# Patient Record
Sex: MALE | Race: WHITE
[De-identification: names, ages, dates, MRNs, and addresses within clinical notes are randomized per-mention and may not be internally consistent; named-entity substitution may affect disease eponyms.]

---

## 2017-04-13 ENCOUNTER — HOSPITAL ENCOUNTER (OUTPATIENT)
Dept: HOSPITAL 80 - BHLMT | Age: 67
End: 2017-04-13
Attending: INTERNAL MEDICINE
Payer: COMMERCIAL

## 2017-04-13 DIAGNOSIS — I10: ICD-10-CM

## 2017-04-13 DIAGNOSIS — E78.5: ICD-10-CM

## 2017-04-13 DIAGNOSIS — I25.10: Primary | ICD-10-CM

## 2017-10-04 ENCOUNTER — HOSPITAL ENCOUNTER (OUTPATIENT)
Dept: HOSPITAL 80 - FED | Age: 67
Setting detail: OBSERVATION
LOS: 1 days | Discharge: HOME | End: 2017-10-05
Attending: HOSPITALIST | Admitting: HOSPITALIST
Payer: COMMERCIAL

## 2017-10-04 DIAGNOSIS — I25.119: Primary | ICD-10-CM

## 2017-10-04 DIAGNOSIS — I45.10: ICD-10-CM

## 2017-10-04 DIAGNOSIS — M54.5: ICD-10-CM

## 2017-10-04 DIAGNOSIS — Z95.5: ICD-10-CM

## 2017-10-04 DIAGNOSIS — Z95.1: ICD-10-CM

## 2017-10-04 DIAGNOSIS — N17.9: ICD-10-CM

## 2017-10-04 DIAGNOSIS — E86.0: ICD-10-CM

## 2017-10-04 DIAGNOSIS — I10: ICD-10-CM

## 2017-10-04 DIAGNOSIS — Z23: ICD-10-CM

## 2017-10-04 DIAGNOSIS — E78.5: ICD-10-CM

## 2017-10-04 DIAGNOSIS — F41.8: ICD-10-CM

## 2017-10-04 LAB
% IMMATURE GRANULYOCYTES: 0.3 % (ref 0–1.1)
ABSOLUTE IMMATURE GRANULOCYTES: 0.03 10^3/UL (ref 0–0.1)
ABSOLUTE NRBC COUNT: 0 10^3/UL (ref 0–0.01)
ADD DIFF?: NO
ADD MORPH?: NO
ADD SCAN?: NO
ALBUMIN SERPL-MCNC: 4.7 G/DL (ref 3.5–5)
ALP SERPL-CCNC: 102 IU/L (ref 38–126)
ALT SERPL-CCNC: 55 IU/L (ref 21–72)
ANION GAP SERPL CALC-SCNC: 15 MEQ/L (ref 8–16)
APTT BLD: 29.2 SEC (ref 23–38)
AST SERPL-CCNC: 30 IU/L (ref 17–59)
ATYPICAL LYMPHOCYTE FLAG: 10 (ref 0–99)
BILIRUB SERPL-MCNC: 1.4 MG/DL (ref 0.1–1.4)
BILIRUBIN-CONJUGATED: 0.4 MG/DL (ref 0–0.5)
BILIRUBIN-UNCONJUGATED: 1 MG/DL (ref 0–1.1)
CALCIUM SERPL-MCNC: 10.3 MG/DL (ref 8.5–10.4)
CHLORIDE SERPL-SCNC: 106 MEQ/L (ref 97–110)
CO2 SERPL-SCNC: 19 MEQ/L (ref 22–31)
CREAT SERPL-MCNC: 1.5 MG/DL (ref 0.7–1.3)
ERYTHROCYTE [DISTWIDTH] IN BLOOD BY AUTOMATED COUNT: 12.5 % (ref 11.5–15.2)
FRAGMENT RBC FLAG: 0 (ref 0–99)
GFR SERPL CREATININE-BSD FRML MDRD: 47 ML/MIN/{1.73_M2}
GLUCOSE SERPL-MCNC: 158 MG/DL (ref 70–100)
HCT VFR BLD CALC: 49 % (ref 40–51)
HGB BLD-MCNC: 17.5 G/DL (ref 13.7–17.5)
INR PPP: 1.03 (ref 0.83–1.16)
LEFT SHIFT FLG: 0 (ref 0–99)
LIPEMIA HEMOLYSIS FLAG: 90 (ref 0–99)
MCH RBC BLDCO QN: 32 PG (ref 27.9–34.1)
MCHC RBC AUTO-ENTMCNC: 35.7 G/DL (ref 32.4–36.7)
MCV RBC AUTO: 89.6 FL (ref 81.5–99.8)
NRBC-AUTO%: 0 % (ref 0–0.2)
PLATELET # BLD: 238 10^3/UL (ref 150–400)
PLATELET CLUMPS FLAG: 0 (ref 0–99)
PMV BLD AUTO: 9.5 FL (ref 8.7–11.7)
POTASSIUM SERPL-SCNC: 4 MEQ/L (ref 3.5–5.2)
PROT SERPL-MCNC: 7.5 G/DL (ref 6.3–8.2)
PROTHROMBIN TIME: 13.4 SEC (ref 12–15)
RBC # BLD AUTO: 5.47 10^6/UL (ref 4.4–6.38)
SODIUM SERPL-SCNC: 140 MEQ/L (ref 134–144)
TROPONIN I SERPL-MCNC: < 0.012 NG/ML (ref 0–0.03)

## 2017-10-04 PROCEDURE — G0008 ADMIN INFLUENZA VIRUS VAC: HCPCS

## 2017-10-04 PROCEDURE — 4A023N7 MEASUREMENT OF CARDIAC SAMPLING AND PRESSURE, LEFT HEART, PERCUTANEOUS APPROACH: ICD-10-PCS | Performed by: INTERNAL MEDICINE

## 2017-10-04 PROCEDURE — B2111ZZ FLUOROSCOPY OF MULTIPLE CORONARY ARTERIES USING LOW OSMOLAR CONTRAST: ICD-10-PCS | Performed by: INTERNAL MEDICINE

## 2017-10-04 PROCEDURE — 93005 ELECTROCARDIOGRAM TRACING: CPT

## 2017-10-04 PROCEDURE — 71020: CPT

## 2017-10-04 PROCEDURE — G0378 HOSPITAL OBSERVATION PER HR: HCPCS

## 2017-10-04 PROCEDURE — 99285 EMERGENCY DEPT VISIT HI MDM: CPT

## 2017-10-04 PROCEDURE — B21F1ZZ FLUOROSCOPY OF OTHER BYPASS GRAFT USING LOW OSMOLAR CONTRAST: ICD-10-PCS | Performed by: INTERNAL MEDICINE

## 2017-10-04 PROCEDURE — B2151ZZ FLUOROSCOPY OF LEFT HEART USING LOW OSMOLAR CONTRAST: ICD-10-PCS | Performed by: INTERNAL MEDICINE

## 2017-10-04 PROCEDURE — C1760 CLOSURE DEV, VASC: HCPCS

## 2017-10-04 PROCEDURE — 93459 L HRT ART/GRFT ANGIO: CPT

## 2017-10-04 PROCEDURE — 90686 IIV4 VACC NO PRSV 0.5 ML IM: CPT

## 2017-10-04 RX ADMIN — SODIUM CHLORIDE SCH MLS: 900 INJECTION, SOLUTION INTRAVENOUS at 21:14

## 2017-10-04 NOTE — CPEKG
Heart Rate: 84

RR Interval: 714

P-R Interval: 204

QRSD Interval: 126

QT Interval: 404

QTC Interval: 478

P Axis: 74

QRS Axis: 73

T Wave Axis: 54

EKG Severity - ABNORMAL ECG -

EKG Impression: SINUS RHYTHM

EKG Impression: RIGHT BUNDLE BRANCH BLOCK

Electronically Signed By: Salvador Suero 05-Oct-2017 08:13:40

## 2017-10-04 NOTE — CPEKG
Heart Rate: 66

RR Interval: 909

P-R Interval: 204

QRSD Interval: 126

QT Interval: 428

QTC Interval: 449

P Axis: 42

QRS Axis: 64

T Wave Axis: 53

EKG Severity - ABNORMAL ECG -

EKG Impression: SINUS RHYTHM

EKG Impression: RIGHT BUNDLE BRANCH BLOCK

Electronically Signed By: Salvador Suero 05-Oct-2017 08:13:34

## 2017-10-04 NOTE — EDPHY
General


Narrative: 





CHIEF COMPLAINT: 


Chest pain, shortness of breath





HISTORY OF PRESENT ILLNESS: 


Patient complains of chest pain, high blood pressure, headache and shortness of 

breath.  The high blood pressure and headache started 3 days ago.  Gradual 

onset.  Intermittent duration.  Mild-to-moderate.  No neck pain or stiffness.  

Some shortness of breath with this.  Chest pain started 2 hours prior to 

arrival.  This is left-sided.  It radiates to the back.  Worse with exertion.  

Shortness of breath is associated with.  No nausea or vomiting.  Some 

diaphoresis.  Some cold chills.  No recent travel or trauma.  No history of 

venous thrombolic event.  Does have extensive coronary artery disease status 

post CABG and stents.  Positive family history for coronary artery disease.  No 

other associated complaints or modifying factors.





REVIEW OF SYSTEMS:


Ten systems reviewed and are negative unless otherwise noted in the HPI





PCP:


Dr. Parmar





SPECIALISTS:


Dr. Delgado





PAST MEDICAL HISTORY: 


Coronary artery disease status post CABG a stent placement





PAST SURGICAL HISTORY:


CABG, hernia repair, finger amputation





SOCIAL HISTORY:


Nonsmoker.  No alcohol.  Lives here with his wife





FAMILY HISTORY:


Noncontributory





EXAMINATION


General Appearance:  Alert, no distress


Head: normocephalic, atraumatic


Eyes:  Pupils equal and round, no conjunctival pallor or injection


ENT, Mouth:  Mucous membranes moist


Neck:  Normal inspection, supple, non-tender


Respiratory:  Lungs are clear to auscultation.  No wheezing, rhonchi or crackles


Cardiovascular:  Regular rate and rhythm.  No murmur.


Gastrointestinal:  Abdomen is soft and nontender


Back: non-tender, no bony abnormalities


Neurological:  A&O, nonfocal, normal gait


Skin:  Warm and dry, no rash


Extremities:  Nontender, no pedal edema


Psychiatric:  Mood and affect normal





DIFFERENTIAL DIAGNOSES:


Including but not limited to ACS, PE, pneumonia, CHF








MDM:


6:03 p.m.


Chest pain of 2 hours duration with shortness of breath and dyspnea on 

exertion.  Patient is mildly tachypneic at time of arrival.  This is a patient 

with extensive coronary artery disease with CABG, 3 stents prior CABG, 2 stents 

post CABG.  He has not have a stress test or heart catheterization with in 2 

years.  Does not know what is echocardiogram was.  I have ordered cardiac labs, 

D-dimer, chest x-ray.  Plan for admission to the hospital for serial troponins





7:00 p.m.


Chest x-ray unremarkable.  Laboratory studies are negative, but troponin is 

pending.





8:00 p.m.


Troponin is negative.  D-dimer negative.  Serum creatinine is mildly elevated 

but relatively close to baseline.  I discussed the case with the hospitalist.  

He will be admitted in stable condition for serial troponins.  Repeat EKG has 

been requested by Dr. Cushing and this is currently being obtained.  Suspect 

that this is stable angina versus unstable angina.














- Diagnostics


Imaging Results: 


 Imaging Impressions





Chest X-Ray  10/04/17 18:02


Impression: Clear lungs. No acute cardiopulmonary process.














- History


Smoking Status: Never smoked





- Objective


Vital Signs: 


 Initial Vital Signs











Temperature (C)  97.5 F   10/04/17 17:48


 


Heart Rate  76   10/04/17 17:48


 


Respiratory Rate  21 H  10/04/17 17:48


 


Blood Pressure  111/74   10/04/17 17:48


 


O2 Sat (%)  98   10/04/17 17:48








 











O2 Delivery Mode               Room Air














Allergies/Adverse Reactions: 


 





clindamycin Allergy (Verified 10/04/17 17:46)


 








Home Medications: 














 Medication  Instructions  Recorded


 


Aspirin [Aspirin 325 mg (*)] 325 mg PO DAILY 11/18/16


 


Atorvastatin Calcium [Lipitor 80 80 mg PO DAILY 11/18/16





mg]  


 


DULoxetine [Cymbalta 60 MG (*)] 60 mg PO DAILY 11/18/16


 


Herbals/Supplements -Info Only 1 ea PO DAILY 11/18/16


 


Hydrochlorothiazide [HCTZ (*)] 25 mg PO DAILY 11/18/16


 


Lisinopril [Zestril 10 mg (*)] 10 mg PO DAILY 11/18/16


 


Multivitamins [Multivitamin (*)] 1 each PO DAILY 11/18/16


 


Nebivolol HCl [Bystolic 5 mg (*)] 5 mg PO DAILY 11/18/16


 


Omega-3 Fatty Acids [Fish Oil 1000 1,000 mg PO DAILY 11/18/16





mg (*)]  


 


traMADol [Ultram 50 mg (*)] 50 mg PO HS 11/18/16


 


Clopidogrel Bisulfate [Plavix (*)] 75 mg PO HS 10/04/17


 


Docusate Sodium [Colace 100 MG (*)] 100 mg PO Q2D 10/04/17


 


traZODone [traZODONE 100MG (*)] 100 mg PO HS 10/04/17











Laboratory Results: 


 Laboratory Results





 10/04/17 17:47 





 10/04/17 17:47 





 











  10/04/17 10/04/17 10/04/17





  17:47 17:47 17:47


 


WBC      9.84 10^3/uL H 10^3/uL





     (3.80-9.50) 


 


RBC      5.47 10^6/uL 10^6/uL





     (4.40-6.38) 


 


Hgb      17.5 g/dL g/dL





     (13.7-17.5) 


 


Hct      49.0 % %





     (40.0-51.0) 


 


MCV      89.6 fL fL





     (81.5-99.8) 


 


MCH      32.0 pg pg





     (27.9-34.1) 


 


MCHC      35.7 g/dL g/dL





     (32.4-36.7) 


 


RDW      12.5 % %





     (11.5-15.2) 


 


Plt Count      238 10^3/uL 10^3/uL





     (150-400) 


 


MPV      9.5 fL fL





     (8.7-11.7) 


 


Neut % (Auto)      79.2 % H %





     (39.3-74.2) 


 


Lymph % (Auto)      10.4 % L %





     (15.0-45.0) 


 


Mono % (Auto)      8.7 % %





     (4.5-13.0) 


 


Eos % (Auto)      0.9 % %





     (0.6-7.6) 


 


Baso % (Auto)      0.5 % %





     (0.3-1.7) 


 


Nucleat RBC Rel Count      0.0 % %





     (0.0-0.2) 


 


Absolute Neuts (auto)      7.79 10^3/uL H 10^3/uL





     (1.70-6.50) 


 


Absolute Lymphs (auto)      1.02 10^3/uL 10^3/uL





     (1.00-3.00) 


 


Absolute Monos (auto)      0.86 10^3/uL H 10^3/uL





     (0.30-0.80) 


 


Absolute Eos (auto)      0.09 10^3/uL 10^3/uL





     (0.03-0.40) 


 


Absolute Basos (auto)      0.05 10^3/uL 10^3/uL





     (0.02-0.10) 


 


Absolute Nucleated RBC      0.00 10^3/uL 10^3/uL





     (0-0.01) 


 


Immature Gran %      0.3 % %





     (0.0-1.1) 


 


Immature Gran #      0.03 10^3/uL 10^3/uL





     (0.00-0.10) 


 


PT    13.4 SEC SEC  





    (12.0-15.0)  


 


INR    1.03   





    (0.83-1.16)  


 


APTT    29.2 SEC SEC  





    (23.0-38.0)  


 


D-Dimer    < 0.27 ug/mLFEU ug/mLFEU  





    (0.00-0.50)  


 


Sodium  140 mEq/L mEq/L    





   (134-144)   


 


Potassium  4.0 mEq/L mEq/L    





   (3.5-5.2)   


 


Chloride  106 mEq/L mEq/L    





   ()   


 


Carbon Dioxide  19 mEq/l L mEq/l    





   (22-31)   


 


Anion Gap  15 mEq/L mEq/L    





   (8-16)   


 


BUN  29 mg/dL H mg/dL    





   (7-23)   


 


Creatinine  1.5 mg/dL H mg/dL    





   (0.7-1.3)   


 


Estimated GFR  47     





    


 


Glucose  158 mg/dL H mg/dL    





   ()   


 


Calcium  10.3 mg/dL mg/dL    





   (8.5-10.4)   


 


Total Bilirubin  1.4 mg/dL mg/dL    





   (0.1-1.4)   


 


Conjugated Bilirubin  0.4 mg/dL mg/dL    





   (0.0-0.5)   


 


Unconjugated Bilirubin  1.0 mg/dL mg/dL    





   (0.0-1.1)   


 


AST  30 IU/L IU/L    





   (17-59)   


 


ALT  55 IU/L IU/L    





   (21-72)   


 


Alkaline Phosphatase  102 IU/L IU/L    





   ()   


 


Troponin I  < 0.012 ng/mL ng/mL    





   (0.000-0.034)   


 


NT-Pro-B Natriuret Pep  459 pg/mL H pg/mL    





   (0-125)   


 


Total Protein  7.5 g/dL g/dL    





   (6.3-8.2)   


 


Albumin  4.7 g/dL g/dL    





   (3.5-5.0)   


 


Lipase  66 IU/L IU/L    





   ()   











Medications Given: 


 





Acetaminophen (Tylenol)  650 mg PO Q4HRS PRN


   PRN Reason: Pain, Mild/Fever, Can Take PO


   Stop: 04/02/18 19:17


   Last Admin: 10/04/17 21:28 Dose:  650 mg


Atorvastatin Calcium (Lipitor)  80 mg PO HS [facility]


   Stop: 04/02/18 20:59


   Last Admin: 10/04/17 21:28 Dose:  80 mg


Clopidogrel Bisulfate (Plavix)  75 mg PO HS [facility]


   Stop: 04/02/18 20:59


   Last Admin: 10/04/17 21:28 Dose:  75 mg


Duloxetine HCl (Cymbalta)  60 mg PO [facility]


   Stop: 04/02/18 20:59


   Last Admin: 10/04/17 21:27 Dose:  60 mg


Heparin Sodium (Porcine) (Heparin 50 Units/Ml (Premix))  500 mls @ 0 mls/hr IV 

CONT DARRYL; Per Protocol


   PRN Reason: Protocol


   Stop: 04/02/18 19:44


   Last Admin: 10/04/17 21:13 Dose:  500 mls


Sodium Chloride (Ns)  1,000 mls @ 75 mls/hr IV CONT [facility]


   Stop: 04/02/18 19:59


   Last Admin: 10/04/17 21:14 Dose:  1,000 mls


Tramadol HCl (Ultram)  100 mg PO BID [facility]


   Stop: 04/02/18 20:59


   Last Admin: 10/04/17 21:27 Dose:  100 mg


Trazodone HCl (Trazodone)  100 mg PO [facility]


   Stop: 04/02/18 20:59


   Last Admin: 10/04/17 21:28 Dose:  100 mg





Discontinued Medications





Aspirin (Aspirin)  325 mg PO EDNOW ONE


   Stop: 10/04/17 19:24


   Last Admin: 10/04/17 21:21 Dose:  Not Given


Aspirin (Aspirin)  325 mg PO EDNOW ONE


   Stop: 10/04/17 19:24


   Last Admin: 10/04/17 21:19 Dose:  Not Given


Heparin Sodium (Porcine) (Heparin Injection)  0 unit IVP ONCE ONE


   PRN Reason: Protocol


   Stop: 10/04/17 19:46


   Last Admin: 10/04/17 21:11 Dose:  5,882 units








Departure





- Departure


Disposition: [facility]


Clinical Impression: 


 Stable angina





CAD (coronary artery disease)


Qualifiers:


 Coronary Disease-Associated Artery/Lesion type: unspecified vessel or lesion 

type Native vs. transplanted heart: native heart Associated angina: with stable 

angina Qualified Code(s): I25.118 - Atherosclerotic heart disease of native 

coronary artery with other forms of angina pectoris





Condition: Good


Referrals: 


Snow Parmar MD [[facility] Primary Care Provider] - As per Instructions

## 2017-10-05 VITALS
HEART RATE: 50 BPM | SYSTOLIC BLOOD PRESSURE: 145 MMHG | DIASTOLIC BLOOD PRESSURE: 81 MMHG | TEMPERATURE: 97.5 F | OXYGEN SATURATION: 96 %

## 2017-10-05 VITALS — RESPIRATION RATE: 17 BRPM

## 2017-10-05 LAB
ANION GAP SERPL CALC-SCNC: 10 MEQ/L (ref 8–16)
CALCIUM SERPL-MCNC: 9.7 MG/DL (ref 8.5–10.4)
CHLORIDE SERPL-SCNC: 104 MEQ/L (ref 97–110)
CO2 SERPL-SCNC: 25 MEQ/L (ref 22–31)
CREAT SERPL-MCNC: 1.5 MG/DL (ref 0.7–1.3)
GFR SERPL CREATININE-BSD FRML MDRD: 47 ML/MIN/{1.73_M2}
GLUCOSE SERPL-MCNC: 101 MG/DL (ref 70–100)
POTASSIUM SERPL-SCNC: 3.8 MEQ/L (ref 3.5–5.2)
SODIUM SERPL-SCNC: 139 MEQ/L (ref 134–144)

## 2017-10-05 RX ADMIN — SODIUM CHLORIDE SCH MLS: 900 INJECTION, SOLUTION INTRAVENOUS at 10:44

## 2017-10-05 NOTE — GDS
[f rep st]



                                                             DISCHARGE SUMMARY





DISCHARGE DIAGNOSES:  

1.  Noncardiac chest pain, suspect probable anxiety.

2.  Coronary artery disease, status post previous stents and coronary artery bypass graft.

3.  Acute renal failure due to dehydration.



HISTORY:  The patient is a 67-year-old male who has an extensive cardiac history including previous s
tents that failed, leading to a 4-vessel CABG.  He has done well for almost 10 years.  He now represe
nts with recurrence of chest pain.  Given his complicated history, cardiology elected to bring him [facility] to cardiac catheterization.  His catheterization looked great and his stents are wide open.  T
here has been no change from previous.  His D-dimer was negative.  Incidentally noted on presentation
 was a creatinine elevation of 1.5 when his baseline is normal.  He was hydrated quite aggressively p
rior to his cardiac catheterization, and according to pressure measurements during cath, Dr. Jasmin amaya he is fully hydrated at this time.  Recommendation per Cardiology is discontinuation of hydrochl
orothiazide at discharge.  He is probably okay to resume lisinopril in the morning.  This should be f
ollowed closely as an outpatient.



DISCHARGE MEDICATIONS:  Please see computerized record for full detailed list.  The only medication c
hange is discontinuation of hydrochlorothiazide.



ADDITIONAL DISCHARGE INSTRUCTIONS:  Repeat CHEM-7 in 1 week. 



Patient was seen and examined by me on the day of discharge.





Job #:  [id number]

## 2017-10-05 NOTE — ASMTCMCOM
CM Note

 

CM Note                       

Notes:

Please disregard note an follow previous note with NTBD.

Chart reviewed. Discussed status with patient RN. No needs identified at present time. CM available 


should needs arise.

 

Date Signed:  10/05/2017 03:54 PM

Electronically Signed By:Nevaeh Hull RN

## 2017-10-05 NOTE — CPIP
[f rep st]



                                                       INVASIVE CARDIAC PROCEDURE





DATE OF PROCEDURE:  10/05/2017



PROCEDURE:  

1.  Coronary angiography.

2.  Bypass graft angiography. 

3.  Left ventricular end-diastolic pressure.



INDICATION:  Acute coronary syndrome.



ACCESS:  The patient was prepped and draped in sterile fashion.  1% lidocaine was used to anesthetize
 the right inguinal region.  A 6-Bur[place]ian introducer sheath was placed selectively into the right commo
n femoral artery via modified Seldinger technique.



CORONARY ANGIOGRAPHY:  A 6-[place] JL4 was advanced to where the left main coronary artery would be. T
here was no left main coronary artery.  The patient had dual ostia for his left anterior descending c
oronary artery and circumflex coronary arteries.  The 6-Bur[place]ian JL4 was advanced to the left anterior 
descending coronary artery and images obtained.  The left anterior descending coronary artery had a s
cristina discrete 100% stenosis in the proximal segment.  The 6-Burundian JL4 was then placed in the circum
flex coronary artery and images obtained.  The circumflex coronary artery is a moderate-sized vessel.
  The circumflex coronary artery is nondominant.  Circumflex coronary artery had mild diffuse disease
 throughout.  There was no stenosis greater than 10% to 15%.  The first OM artery was a large vessel.
  The first OM artery had a proximal long segmental 30% stenosis present.  The distal OM artery is be
ing filled by a saphenous vein graft.  The 6-Burundian JR4 was advanced to the right coronary artery and
 images obtained.  The right coronary artery is dominant.  The right coronary artery was previously s
tented in the proximal and distal segments.  The previously placed stents are widely patent with no e
vidence of in-stent restenosis.  In the midvessel there is a long segmental 40%-50% stenosis present.




BYPASS GRAFT ANGIOGRAPHY:  A 6-Burundian JR4 was used to engage the saphenous vein graft to diagonal art
virgie.  The saphenous vein graft to diagonal artery was widely patent, with no evidence of significant 
stenosis.  A 6-Burundian LCB catheter was used to engage the saphenous vein graft to OM artery.  The sap
henous vein graft to OM artery was widely patent.  A 6-Burundian DENILSON catheter was used to engage the BACON
A to LAD graft.  The LIMA to LAD graft was widely patent.



LEFT VENTRICULAR END-DIASTOLIC PRESSURE:  A 6-Burundian pigtail catheter was advanced to the left ventri
arline and left ventricular end-diastolic pressure obtained.  The left ventricular end-diastolic pressur
e was mildly elevated at 20 mmHg.



COMPLICATIONS:  None.



CONCLUSIONS:  

1.  2-vessel coronary artery disease.

2.  Patent bypass graft to the left anterior descending coronary artery, diagonal coronary artery and
 OM coronary artery.

3.  Patent right coronary artery stents with no evidence of significant in-stent restenosis.

4.  Mildly elevated left ventricular end-diastolic pressure.



PLAN:  For medical management.





Job #:  [id number]

## 2017-10-05 NOTE — GCON
[f rep st]



                                                                    CONSULTATION





DATE OF CONSULTATION:  10/05/2017



CHIEF COMPLAINT:  We have been asked by Dr. Cushing to evaluate patient with a chief complaint of patrice
st pain.



HISTORY OF PRESENT ILLNESS:  Patient is a 67-year-old gentleman with known coronary artery disease, w
ho presents with a chief complaint of chest pain.  The patient was in his usual state of health until
 approximately 2-3 days prior to admission, when he began to experience symptoms of chest pain.  The 
chest pain is described as a pressure radiating around his chest. It is associated with nausea and sh
ortness of breath on some occasions but not all occasions.  The chest discomfort is associated with e
xertion but can also occur at rest.  The chest discomfort was not exactly like his previous angina bu
t was similar in many respects prompting him to seek further evaluation in the emergency department. 
 In the emergency department, he had an EKG performed demonstrating a new right bundle branch block w
ith no acute ST- or T-wave changes.  His initial troponin was within normal limits.  We have been con
sulted to help in the further management of this patient.  Patient does have known coronary artery di
sease.  In 2008, he presented with an anterior myocardial infarction, was treated with percutaneous c
oronary intervention of his left anterior descending coronary artery.  Later that same year, he devel
oped in-stent restenosis of his LAD stent, and ultimately underwent CABG x4.  In 2009, he developed r
ecurrent anginal symptoms and was found to have an occluded saphenous vein graft to his right coronar
y artery.  His right coronary artery was treated with percutaneous coronary intervention at that time
.  The patient has done well until recently.



PAST MEDICAL HISTORY:  

1.  Coronary artery disease.

2.  Hypertension.

3.  Hyperlipidemia.

4.  Depression and anxiety.

5.  Chronic back pain.



MEDICATIONS:  Please see medicine reconciliation form.



SOCIAL HISTORY:  Patient lives independently with his wife.  He does not smoke.  He denies problems w
ith alcohol.



FAMILY HISTORY:  Noncontributory.



REVIEW OF SYSTEMS:  10-point Review of Systems is negative, except as noted in HPI.



PHYSICAL EXAMINATION:  GENERAL:  Patient is resting in bed.  He does not appear to be in acute distre
ss at this time.  VITAL SIGNS:  Temperature is afebrile.  Pulse is 67.  Blood pressure 100/65, respir
atory rate 18, SaO2 94% on room air.  HEENT:  Normocephalic, atraumatic.  Extraocular muscles intact.
  NECK:  No JVD.  No bruits.  LUNGS:  Clear to auscultation bilaterally.  CARDIOVASCULAR:  Regular ra
te and rhythm.  S1, S2.  Grade 2/6 holosystolic murmur is noted at the left sternal border.  ABDOMEN:
  Soft, nontender.  Normoactive bowel sounds.  No hepatosplenomegaly noted.  Aorta could not be adequ
ately palpated.  EXTREMITIES:  No clubbing, cyanosis, or edema.  SKIN:  No evidence of rash. NEURO:  
Patient is awake, alert, and oriented x3.



LABORATORY:  White blood cell count is 9.84.  Hemoglobin is 17.5.  Hematocrit 49.0, platelet count is
 238.  Sodium 139, potassium 3.8, chloride 104, CO2 25, BUN 39, creatinine 1.5.  INR 1.03.  D-dimer w
ithin normal limits.  EKG demonstrates sinus rhythm, right bundle branch block morphology.  No acute 
ST- or T-wave changes.



ASSESSMENT AND PLAN:  Patient is a 67-year-old gentleman with known coronary artery disease, who pres
ents with symptoms of chest pain.  His chest pain is similar to his previous angina in some respects 
but different in other respects.  His initial troponin is within normal limits.  However, his EKG cameron
s demonstrate new right bundle branch block.  Reviewed options for risk stratification including card
iac catheterization and conservative management.  The patient wishes to pursue cardiac catheterizatio
n.  We will arrange to have this performed.





Job #:  [id number]

## 2017-10-05 NOTE — ASMTCMCOM
CM Note

 

CM Note                       

Notes:

Chart reviewed. Pt living independent with spouse prior to admission. He has significant coronary 

history, NTBD at this time. CM to follow.

 

Date Signed:  10/05/2017 11:09 AM

Electronically Signed By:Nevaeh Hull RN

## 2017-10-06 NOTE — ASDISCHSUM
----------------------------------------------

Discharge Information

----------------------------------------------

Plan Status:Home with No Needs                       Medically Cleared to Leave:10/05/2017

Discharge Date:10/05/2017 06:38 PM                   CM D/C Disposition:

ADT D/C Disposition:Home, Routine, Self-Care         Projected Discharge Date:10/05/2017 12:00 AM

Transportation at D/C:                               Discharge Delay Reason:

Follow-Up Date:10/05/2017 12:00 AM                   Discharge Slot:

Final Diagnosis:

----------------------------------------------

Placement Information

----------------------------------------------

----------------------------------------------

Patient Contact Information

----------------------------------------------

Contact Name:NGA                          Relationship:Wife

Address:[address]                              Home [phone]

                                                     Work [phone]

City:[place]                                         Alternate Phone:

State/Zip Code:[place] [address]                              Email:

----------------------------------------------

Financial Information

----------------------------------------------

Financial Class:

Primary Plan Desc:MEDICARE OUTPATIENT                Primary Plan Number:[id number]

Secondary Plan Desc:[employer]/MDR SUPPLEMENT              Secondary Plan Number:[id number]

 

 

----------------------------------------------

Assessment Information

----------------------------------------------

----------------------------------------------

[facility] CM Progress Note

----------------------------------------------

CM Note

 

CM Note                       

Notes:

Chart reviewed. Pt living independent with spouse prior to admission. He has significant coronary 

history, NTBD at this time. CM to follow.

 

Date Signed:  10/05/2017 11:09 AM

Electronically Signed By:Nevaeh Hull RN

 

 

----------------------------------------------

[facility] CM Progress Note

----------------------------------------------

CM Note

 

CM Note                       

Notes:

Please disregard note an follow previous note with NTBD.

Chart reviewed. Discussed status with patient RN. No needs identified at present time. CM available 


should needs arise.

 

Date Signed:  10/05/2017 03:54 PM

Electronically Signed By:Nevaeh Hull RN

 

 

----------------------------------------------

Intervention Information

----------------------------------------------

Intervention Type:*PRESSLEY-Signed                       Date of Service:10/05/2017 10:14 AM

Patient Type:Observation                             Staff Member:Mine Reyes

Hours:                                               Discipline:

Severity:                                            Comment:

## 2018-04-11 ENCOUNTER — HOSPITAL ENCOUNTER (OUTPATIENT)
Dept: HOSPITAL 80 - FIMAGING | Age: 68
End: 2018-04-11
Attending: GENERAL ACUTE CARE HOSPITAL
Payer: COMMERCIAL

## 2018-04-11 DIAGNOSIS — G95.9: ICD-10-CM

## 2018-04-11 DIAGNOSIS — G93.9: Primary | ICD-10-CM

## 2018-04-11 DIAGNOSIS — R90.82: ICD-10-CM

## 2018-04-11 PROCEDURE — A9585 GADOBUTROL INJECTION: HCPCS

## 2018-04-11 PROCEDURE — 70553 MRI BRAIN STEM W/O & W/DYE: CPT

## 2018-05-01 ENCOUNTER — HOSPITAL ENCOUNTER (OUTPATIENT)
Dept: HOSPITAL 80 - BHLMT | Age: 68
End: 2018-05-01
Attending: INTERNAL MEDICINE
Payer: COMMERCIAL

## 2018-05-01 DIAGNOSIS — I25.10: Primary | ICD-10-CM

## 2018-05-01 PROCEDURE — 78452 HT MUSCLE IMAGE SPECT MULT: CPT

## 2018-05-01 PROCEDURE — 93017 CV STRESS TEST TRACING ONLY: CPT

## 2018-05-15 ENCOUNTER — HOSPITAL ENCOUNTER (OUTPATIENT)
Dept: HOSPITAL 80 - BHFA | Age: 68
End: 2018-05-15
Attending: INTERNAL MEDICINE
Payer: COMMERCIAL

## 2018-05-15 DIAGNOSIS — I25.10: Primary | ICD-10-CM

## 2018-05-15 DIAGNOSIS — I10: ICD-10-CM

## 2022-01-01 NOTE — [facility]
[f 
rep st]



                                                            HISTORY AND PHYSICAL





DATE OF ADMISSION:  10/04/2017



CHIEF COMPLAINT:  Chest pain.



HISTORY OF PRESENT ILLNESS:  The patient is a 67-year-old male with an 
extensive cardiac history including a total of 7 stents and a prior 4-vessel 
CABG, all of which occurred in 2008 and 2009, who presents to the emergency 
department with chest pressure.  He states his symptoms started 2 days ago.  He 
describes them as chest tightness that occurs with an attempt to do any 
activity.  He tends to be chest free at rest but, when he gets up to do activity
, he starts to feel chest tightness.  He endorses associated diaphoresis, 
nausea and some shortness of breath.  He describes intermittent diaphoresis and 
nausea over the past 2 days.  At the time of my evaluation, he is chest pain-
free.  



In the emergency department, an EKG reveals sinus rhythm with a right bundle 
branch block; this is new from his prior EKG in 2016.  His initial troponin is 
negative.  He also has a negative D-dimer.  He will be given a full-dose 
aspirin in the emergency department and admitted to the progressive care unit 
for ongoing management.



PAST MEDICAL HISTORY:  

1.  Coronary artery disease, status post a total of 7 cardiac stents.

2.  History of 4-vessel CABG.

3.  Hypertension.

4.  Hyperlipidemia.

5.  Depression and anxiety.

6.  Chronic back pain secondary to spinal stenosis.



PAST SURGICAL HISTORY:  

1.  4-vessel CABG in 2008.  

2.  History of 2 prior angiograms in 2008 in 2009.



SOCIAL HISTORY:  The patient lives independently with his wife.  He is a 
lifetime nonsmoker.  He reports occasional alcohol use.  He denies drug use.  
He is semi-retired as an .



FAMILY HISTORY:  Reviewed and noncontributory.



REVIEW OF SYSTEMS:  A 10-point review of systems was performed and is negative 
as per HPI.



PHYSICAL EXAMINATION:  VITAL SIGNS:  Temperature is 36.6, current blood 
pressure of 96/74, heart rate 63, respiratory rate 16. He is 94% on room air. 
GENERAL:  Patient is awake, alert, oriented, in no acute distress.  HEENT:  
Head is atraumatic, normocephalic.  Pupils equal, round, react to light.  
Extraocular muscles intact.  Oropharynx clear.  Ears are moist.  NECK:  Supple.
  There is no JVD.  HEART:  Regular rate without murmur.  LUNGS: Clear to 
auscultation bilaterally.  ABDOMEN:  Soft, nontender.  Normoactive bowel 
sounds.  EXTREMITIES:  Without cyanosis, clubbing, or edema. NEUROLOGIC:  
Grossly nonfocal.



LABORATORY DATA:  CBC reveals a white blood cell count of 9.8, hematocrit 49.  
INR is 1.03.  D-dimer is negative.  Basic metabolic panel shows normal 
electrolytes though slightly low serum bicarb of 19.  Creatinine of 1.5 which 
is up from his baseline of around 1.  Initial troponin is negative.  NT proBNP 
is 459.  LFTs are normal. 



EKG on arrival shows normal sinus rhythm with a right bundle branch block.  
There appears to be some downsloping ST segments in the anterior leads which 
were not present on prior EKG; however, this is difficult to interpret given 
his right bundle branch block.  It is noted this right bundle is new and was 
not present on his prior EKG in October 2016. 



Chest x-ray, personally reviewed and interpreted, shows clear lungs with no 
acute cardiopulmonary process.



ASSESSMENT AND PLAN:  The patient is a 67-year-old male with a history of 
extensive coronary artery disease, who presents to the emergency department 
with chest pain concerning for unstable angina.

1.  Unstable angina.  It is reassuring that the patient has a negative troponin 
as his chest pain started 2 days ago.  He does have some EKG changes compared 
to his previous EKG in 2016.  He is currently chest pain free at rest.  He will 
be admitted to the progressive care unit.  He will be made nothing by mouth at 
midnight.  I will give him a full-dose aspirin now and continue his Plavix.  In 
addition, we will start a heparin drip with a bolus and continue his statin.  I 
am going to defer beta blocker for now given his mild hypotension.  We will 
provide p.r.n. sublingual nitroglycerin for recurrent chest pain.  I discussed 
the case with on-call cardiologist, Dr. Suero.  We will plan to keep him nothing 
by mouth at midnight for angiogram in the morning.  Should he have any acute 
changes overnight with increase in chest pain or a rise in his troponin, he may 
need to be catheterized more urgently.

2.  Coronary artery disease with history of prior coronary artery bypass graft.
  The patient will be continued on his aspirin, Plavix and statin.  As above 
holding beta blocker due to hypotension and will likely proceed to the 
catheterization lab in the morning.

3.  Acute kidney injury.  I suspect this may be prerenal as he reports 
decreased oral intake recently.  We will gently hydrate him overnight with 
normal saline and recheck this in the morning.  Hopefully, it is improved as he 
will likely require contrast load with his angiogram.

4.  Hypertension.  Patient is currently hypotensive and I am holding his 
antihypertensives at this time.

5.  Metabolic acidosis, query starvation ketosis.  Again, gentle hydration is 
planned.  We will recheck this in the morning.

6.  Full code

7.  DVT PPLX, on heparin

8.  Dispo, obs





Job #:  [id number]

MTDD
Airway patent, normal appearing mouth, nose, throat, neck supple with full range of motion, no cervical adenopathy.